# Patient Record
Sex: FEMALE | NOT HISPANIC OR LATINO | Employment: UNEMPLOYED | URBAN - METROPOLITAN AREA
[De-identification: names, ages, dates, MRNs, and addresses within clinical notes are randomized per-mention and may not be internally consistent; named-entity substitution may affect disease eponyms.]

---

## 2020-08-20 ENCOUNTER — TELEPHONE (OUTPATIENT)
Dept: CONSULT | Facility: CLINIC | Age: 25
End: 2020-08-20

## 2020-08-20 NOTE — TELEPHONE ENCOUNTER
Received referral and records for genetics. Schedule with genetic counselor (Makeda hCavez)     Shania

## 2020-08-24 NOTE — TELEPHONE ENCOUNTER
Tried to call to schedule appointment with Makeda Day in genetics.  Was unable to leave a voicemail as mailbox was full.    Shania

## 2020-08-28 NOTE — TELEPHONE ENCOUNTER
Tried to call to schedule appointment with Makeda Day () but unable to leave a message as mailbox was full.  No other number available to call.    Shania

## 2020-09-11 ENCOUNTER — MEDICAL CORRESPONDENCE (OUTPATIENT)
Dept: HEALTH INFORMATION MANAGEMENT | Facility: CLINIC | Age: 25
End: 2020-09-11

## 2020-09-11 ENCOUNTER — TRANSCRIBE ORDERS (OUTPATIENT)
Dept: MATERNAL FETAL MEDICINE | Facility: CLINIC | Age: 25
End: 2020-09-11

## 2020-09-11 ENCOUNTER — TRANSFERRED RECORDS (OUTPATIENT)
Dept: HEALTH INFORMATION MANAGEMENT | Facility: CLINIC | Age: 25
End: 2020-09-11

## 2020-09-11 DIAGNOSIS — O26.90 PREGNANCY RELATED CONDITION, ANTEPARTUM: Primary | ICD-10-CM

## 2020-09-23 ENCOUNTER — PRE VISIT (OUTPATIENT)
Dept: MATERNAL FETAL MEDICINE | Facility: CLINIC | Age: 25
End: 2020-09-23

## 2020-09-25 ENCOUNTER — OFFICE VISIT (OUTPATIENT)
Dept: MATERNAL FETAL MEDICINE | Facility: CLINIC | Age: 25
End: 2020-09-25
Attending: ADVANCED PRACTICE MIDWIFE
Payer: COMMERCIAL

## 2020-09-25 DIAGNOSIS — Z84.89 FAMILY HISTORY OF GENETIC DISORDER: ICD-10-CM

## 2020-09-25 DIAGNOSIS — O26.90 PREGNANCY RELATED CONDITION, ANTEPARTUM: Primary | ICD-10-CM

## 2020-09-25 PROCEDURE — 96040 ZZH GENETIC COUNSELING, EACH 30 MINUTES: CPT | Mod: ZF | Performed by: GENETIC COUNSELOR, MS

## 2020-09-25 NOTE — PROGRESS NOTES
[facility]  Genetic Counseling Consult    Patient:  Lizy Lovelace YOB: 1995   Date of Service:  20      Lizy Lovelace was seen at the [facility] for genetic consultation due to her family history of Aarskog-Hunter Syndrome and Lizy's carrier screening results. Genetic counseling student, Shital Moscoso, was present for the appointment.       Impression/Plan:   1.  Lizy had a genetic consultation today to discuss her family history of Aarskog-Hunter syndrome as well as her carrier screening results that showed that she has an intermediate Fragile X Syndrome allele.    Lizy's brother has a clinical diagnosis of Aarskog-Hunter Syndrome. See the Family History section below for more details on his clinical picture. Lizy did not know if her brother has done genetic testing. She plans to follow up with him and her mother. Lizy has done her own internet research on the condition and inheritance patterns. Today, we reviewed the known genetic causes of Aarskog-Hunter, the inheritance pattern, clinical features, and genetic testing options with Lizy.     Lizy elected to NOT proceed with genetic testing to determine if she is a carrier for Aarskog-Hunter Syndrome due to the low yield and uncertainty regarding her brother's genetic testing. Lizy stated that she wants to start by asking her brother and mother if they have had genetic testing for this condition first. Lizy stated that her own carrier status wouldn't change any of her reproductive planning decisions, however, she stated that she appreciated the information as her main goal was to learn more.     We also reviewed her carrier screening results that identified her as a carrier of an intermediate Fragile X Syndrome allele. See the Carrier Screening section below for more detail.    Pregnancy History:   /Parity:    Age at Delivery: 25 year old  MARCOS: 12/3/2020, by Last  Menstrual Period  Gestational Age: 30w1d    No significant complications or exposures were reported in the current pregnancy.    Lizy eldridge pregnancy history is significant for:  o SAB 7w0d  o Term  female 2019  o SAB 8w0d    Medical History:   Lizy eldridge reported medical history is not expected to impact pregnancy management or risks to fetal development.       Family History:   A three-generation pedigree was obtained, and is scanned under the  Media  tab.   The following significant findings were reported by Lizy:    Lizy reports a previous pregnancy history of one healthy, living daughter and two spontaneous miscarriages at approximately 7w and 8w.    Lizy reports her brother received a childhood diagnosis of Aarskog-Hunter syndrome. He is now 30 years old and doing well. Lizy is not sure if genetic testing was completed. We discussed that her brother likely received a clinical and not a genetic diagnosis since he saw a doctor for several clinical features including ear differences,  flat feet,  and hearing loss. Lizy will reach out to her mother for more information on genetic testing and brother s diagnosis. Lizy also reports that her brother has a diagnosis of  Asperger s  and intellectual disability.    Lizy reports her mother is likely an asymptomatic carrier of Aarskog-Hunter syndrome, and she is unsure if her mother underwent genetic testing. We discussed that her mother may be an unaffected carrier or her brother s condition may be caused by a new mutation that was not inherited.    Lizy reports her father has type 2 diabetes.    Apart from her brother, Lizy denies any maternal family history of Aarskog-Hunter syndrome clinical features including facial features like cleft lip or palate, digital abnormalities like webbed or curved fingers, male genital abnormalities, heart defects, or intellectual disability.    Lizy also denies any maternal or paternal family history of early menopause or ataxic tremors  that might indicate Fragile-X allele expansion in siblings or other family members. This is expected given Lizy s intermediate allele.    Lizy reports her partner Gaurang has a maternal cousin with multiple sclerosis and not muscular dystrophy as previously reported in her records. Additional questions may be helpful in clarifying this diagnosis.    Otherwise, the reported family history is negative for multiple miscarriages, stillbirths, birth defects, cognitive impairment, known genetic conditions, and consanguinity.      Aarskog-Hunter syndrome is a genetic disorder characterized by intellectual disability, shawl scrotum, brachydactyly, short stature, hypertelorism, cervical vertebral anomalies, and cryptorchidism. Prenatal ultrasound findings may include cleft lip/palate and/or syndactyly. Genetic changes, or mutations, in the FGD1 gene are the only known cause of Aarskog-Hunter Syndrome currently. This gene is located on the X chromosome. The mechanism of disease is unclear but is so far thought to be interfering with cytoskeletal organization and skeletal formation.  Aarskog-Hunter syndrome is inherited in an X-linked recessive manner affecting primarily males. Mild manifestations in females have been reported. Males are primarily affected because they only carry one X chromosome, whereas females carry two X chromosomes. If a female carries one mutation in the FGD1 gene, there is still a working copy to compensate for the non-working copy of the gene. Males, however, only have one copy of the FGD1 gene.     Genetic testing is available for Aarskog-Hunter syndrome and consists of sequencing of the FGD1 gene. Only 20% of affected individuals have identifiable mutations in the FGD1 gene. The cause of Aarskog-Hunter syndrome in people with negative/normal sequencing of the FGD1 gene is currently unknown.    At the conclusion of her appointment today, Lizy elected to NOT proceed with genetic testing to determine if she  is a carrier for Aarskog-Hunter Syndrome due to the low yield and uncertainty regarding her brother's genetic testing. Lizy stated that she wants to start by asking her brother and mother if they have had genetic testing for this condition first. Lizy stated that her own carrier status wouldn't change any of her reproductive planning decisions, however, she stated that she appreciated the information as her main goal was to learn more.     Carrier Screening:   The patient reports that she is of  ancestry:      Cystic fibrosis is an autosomal recessive genetic condition that occurs with increased frequency in individuals of  ancestry and carrier screening for this condition is available.  In addition,  screening in the [facility] includes cystic fibrosis.      The patient reports that the father of the pregnancy has  ancestry:      SIckle Cell Anemia is an autosomal recessive genetic condition that occurs with increased frequency in individuals of  ancestry and carrier screening for this condition is available.  In addition,  screening in the [facility] includes Sickle Cell Anemia.      Expanded carrier screening for mutations in a large panel of genes associated with autosomal recessive conditions including cystic fibrosis, spinal muscular atrophy, and others, is now available.      The patient has had previous carrier screening for 14 conditions ([facility]), the results of which were positive showing that Lizy carries an intermediate Fragile X allele.  A copy of the report was available for our review today.      Fragile X: We reviewed some of the basic genetics and inheritance of Fragile X syndrome.  Fragile X syndrome is caused by a trinucleotide repeat expansion in a gene called FMR1.  This gene is located on the X chromosome.  Thus, males have 1 copy of the FMR1 gene as they have 1 X chromosome.  Women have two copies of the X chromosome  "and 2 copies of the FMR1 gene.      Everyone has an area in the gene where the sequnence or trinucleotide, CGG, repeats itself.  A typical CGG repeat number is 5-40. A full mutation is greater than 200 repeats. This size of repeat turns off the gene and causes the features of Fragile X.       There is an \"in between range\" called a premutation.  A person with 55 to 200 repeats is said to have a pre-mutation.  Premutation carrier females are at increased risk for premature ovarian failure (infertility) and premutation carrier males are at risk for an adult onset movement disorder called Fragile X ataxia.  However, premutation carriers are not at risk for intellectual disability, autism, behavior issue, etc.  A premutation can expand when passed to the next generation into a full mutation.     There is also an \"intermediate range\" where there are 40-54 CGG repeats. This is where Magaly allele falls as she has one allele with 51 repeats and one allele with 29 repeats. A carrier of an intermediate number of repeats does not have any clinical symptoms themselves. They are also not at risk of having a child affected by Fragile X syndrome. However, there is a risk of the repeat number expanding into the premutation range in her children.     A premutation carrier is not at risk of having Fragile X syndrome, however, they may have a child with Fragile X syndrome if the premutation allele expands across generations to a full mutation allele. In general, the risk of a maternal premutation becoming a full mutation on transmission to offspring correlates w/number of CGG repeats in the premutation. For premutations w/<100 repeats, the interruption of the CGG repeats by occasional AGG repeats may help evaluate risk of expansion. Females who are heterozygous for a premutation have a 50% risk of transmitting an abnormal (premutation or full-mutation) allele in each pregnancy. Prenatal counseling should be offered when Magaly " children are of reproductive age.     The symptoms of Fragile X are variable but can include, delayed development of speech and language by age 2.  Most males with fragile X syndrome have mild to moderate intellectual disability, while about one-third of female carriers are intellectually disabled.  Children with fragile X syndrome may also have anxiety and hyperactive behavior such as fidgeting or impulsive actions.  They may have attention deficit disorder (ADD), which includes an impaired ability to maintain attention and difficulty focusing on specific tasks.  About one-third of individuals with fragile X syndrome have features of autism spectrum disorders that affect communication and social interaction.  Seizures occur in about 15 percent of males and about 5 percent of females with fragile X syndrome.  Most males and about half of female carriers have characteristic physical features that become more apparent with age.  These features include a long and narrow face, large ears, a prominent jaw and forehead, unusually flexible fingers, flat feet, and in males, enlarged testicles after puberty.          Risk Assessment for Chromosome Conditions:   We explained that the risk for fetal chromosome abnormalities increases with maternal age. We discussed specific features of common chromosome abnormalities, including Down syndrome, trisomy 13, trisomy 18, and sex chromosome trisomies.      - At age 25 at delivery, the risk to have a baby with Down syndrome is 1 in 1111.     - At age 25 at delivery, the risk to have a baby with any chromosome abnormality is 1 in 455.       Lizy had maternal serum screening earlier in pregnancy, as well as a normal fetal survey ultrasound at 20 weeks gestation.     Non-invasive Prenatal Testing (NIPT)    Maternal plasma cell-free DNA testing    Screens for fetal trisomy 21, trisomy 13, trisomy 18, and sex chromosome aneuploidy    First trimester ultrasound with nuchal translucency and  nasal bone assessment was not performed in this pregnancy, to our knowledge.    Lizy had a Panorama test earlier in pregnancy; we reviewed the results today, which are normal for chromosome 13, chromosome 18 and chromosome 21 (no aneuploidy detected)    Given the accuracy of this test, these results greatly decrease the chance for certain fetal chromosome abnormalities    We discussed the limitations of normal NIPT results    MSAFP (after 15 weeks for open neural tube defect screening) results were within normal limits, which decreased the risk of an open neural tube defect in the pregnancy to 1 in 10,000.         Testing Options:   We discussed the following options:   Non-invasive Prenatal Testing (NIPT)    Maternal plasma cell-free DNA testing; first trimester ultrasound with nuchal translucency and nasal bone assessment is recommended, when appropriate    Screens for fetal trisomy 21, trisomy 13, trisomy 18, and sex chromosome aneuploidy    Cannot screen for open neural tube defects; maternal serum AFP after 15 weeks is recommended       Comprehensive (Level II) ultrasound: Detailed ultrasound performed between 18-22 weeks gestation to screen for major birth defects and markers for aneuploidy.      We reviewed the benefits and limitations of this testing.  Screening tests provide a risk assessment specific to the pregnancy for certain fetal chromosome abnormalities, but cannot definitively diagnose or exclude a fetal chromosome abnormality.  Follow-up genetic counseling and consideration of diagnostic testing is recommended with any abnormal screening result.     Diagnostic tests carry inherent risks- including risk of miscarriage- that require careful consideration.  These tests can detect fetal chromosome abnormalities with greater than 99% certainty.  Results can be compromised by maternal cell contamination or mosaicism, and are limited by the resolution of cytogenetic G-banding technology.  There is no  screening nor diagnostic test that can detect all forms of birth defects or mental disability.     It was a pleasure to be involved with Lizy s care. Face-to-face time of the meeting was 45 minutes.        Makeda Day MS, [facility]  Genetic Counselor  Maternal Fetal Medicine  [facility]   Phone: [phone]  Pager: [phone]  Email: [email or web address]

## 2020-12-08 ENCOUNTER — ANESTHESIA - HEALTHEAST (OUTPATIENT)
Dept: OBGYN | Facility: CLINIC | Age: 25
End: 2020-12-08

## 2020-12-08 ENCOUNTER — SURGERY - HEALTHEAST (OUTPATIENT)
Dept: OBGYN | Facility: CLINIC | Age: 25
End: 2020-12-08

## 2020-12-08 ASSESSMENT — MIFFLIN-ST. JEOR: SCORE: 1643.08

## 2020-12-09 ENCOUNTER — COMMUNICATION - HEALTHEAST (OUTPATIENT)
Dept: SCHEDULING | Facility: CLINIC | Age: 25
End: 2020-12-09

## 2020-12-10 ENCOUNTER — HOME CARE/HOSPICE - HEALTHEAST (OUTPATIENT)
Dept: HOME HEALTH SERVICES | Facility: HOME HEALTH | Age: 25
End: 2020-12-10

## 2020-12-11 ENCOUNTER — HOME CARE/HOSPICE - HEALTHEAST (OUTPATIENT)
Dept: HOME HEALTH SERVICES | Facility: HOME HEALTH | Age: 25
End: 2020-12-11

## 2021-05-26 VITALS
SYSTOLIC BLOOD PRESSURE: 118 MMHG | TEMPERATURE: 97.7 F | DIASTOLIC BLOOD PRESSURE: 78 MMHG | OXYGEN SATURATION: 98 % | RESPIRATION RATE: 16 BRPM | HEART RATE: 77 BPM

## 2021-06-05 VITALS — HEIGHT: 63 IN | BODY MASS INDEX: 36.68 KG/M2 | WEIGHT: 207 LBS

## 2021-06-13 NOTE — ANESTHESIA POSTPROCEDURE EVALUATION
Patient: Lizy Lovelace  Procedure(s):   SECTION  Anesthesia type: epidural    Patient location: Labor and Delivery  Last vitals:   Vitals Value Taken Time   /65 20 1115   Temp 36.9  C (98.5  F) 20 1010   Pulse 70 20 1115   Resp 16 20 1115   SpO2 97 % 20 1115     Post vital signs: stable  Level of consciousness: awake and responds to simple questions  Post-anesthesia pain: pain controlled  Post-anesthesia nausea and vomiting: no  Pulmonary: unassisted, return to baseline  Cardiovascular: stable and blood pressure at baseline  Hydration: adequate  Anesthetic events: no    QCDR Measures:  ASA# 11 - Emma-op Cardiac Arrest: ASA11B - Patient did NOT experience unanticipated cardiac arrest  ASA# 12 - Emma-op Mortality Rate: ASA12B - Patient did NOT die  ASA# 13 - PACU Re-Intubation Rate: NA - No ETT / LMA used for case  ASA# 10 - Composite Anes Safety: ASA10A - No serious adverse event    Additional Notes:

## 2021-06-13 NOTE — ANESTHESIA CARE TRANSFER NOTE
Last vitals:   Vitals:    12/08/20 0922   BP: (!) 89/55   Pulse: 90   Resp: 12   Temp: 37.2  C (99  F)   SpO2: 99%     Patient's level of consciousness is awake  Spontaneous respirations: yes  Maintains airway independently: yes  Dentition unchanged: yes  Oropharynx: oropharynx clear of all foreign objects    QCDR Measures:  ASA# 20 - Surgical Safety Checklist: WHO surgical safety checklist completed prior to induction    PQRS# 430 - Adult PONV Prevention: 4558F - Pt received => 2 anti-emetic agents (different classes) preop & intraop  ASA# 8 - Peds PONV Prevention: NA - Not pediatric patient, not GA or 2 or more risk factors NOT present  PQRS# 424 - Emma-op Temp Management: 4559F - At least one body temp DOCUMENTED => 35.5C or 95.9F within required timeframe  PQRS# 426 - PACU Transfer Protocol: - Transfer of care checklist used  ASA# 14 - Acute Post-op Pain: ASA14B - Patient did NOT experience pain >= 7 out of 10

## 2021-06-13 NOTE — ANESTHESIA PREPROCEDURE EVALUATION
Anesthesia Evaluation      Patient summary reviewed   No history of anesthetic complications     Airway   Mallampati: I  Neck ROM: full   Pulmonary - negative ROS and normal exam                          Cardiovascular - negative ROS and normal exam   Neuro/Psych - negative ROS     Endo/Other - negative ROS   (+) obesity (BMI 37), pregnant     GI/Hepatic/Renal - negative ROS           Dental - normal exam                        Anesthesia Plan  Planned anesthetic: epidural    ASA 2     Anesthetic plan and risks discussed with: patient    Post-op plan: routine recovery

## 2021-06-13 NOTE — ANESTHESIA PROCEDURE NOTES
Epidural Block    Patient location during procedure: OB  Time Called: 12/8/2020 4:24 AM  Reason for Block:labor epidural  Staffing:  Performing  Anesthesiologist: Raciel Foy MD  Preanesthetic Checklist  Completed: patient identified, risks, benefits, and alternatives discussed, timeout performed, consent obtained, at patient's request, airway assessed, oxygen available, suction available, emergency drugs available and hand hygiene performed  Procedure  Patient position: sitting  Prep: ChloraPrep  Patient monitoring: continuous pulse oximetry, heart rate and blood pressure  Approach: midline  Location: L3-L4  Injection technique: ESAU saline  Number of Attempts:1  Needle  Needle type: NemeriXекатерина   Needle gauge: 18 G     Catheter in Space: 4  Assessment  Sensory level:  No complications      Additional Notes:  After negative aspirate for heme/CSF, a test dose was given using 3 ml 1.5% xylocaine with epinephrine 1:200K.  Test dose was negative.  Catheter was taped securely.  Epidural catheter was then bolused with 8ml 0.25% Marcaine.  Epidural infusion initiated by OB RN as per orders.  Procedure was well tolerated.

## 2021-06-16 PROBLEM — Z34.90 PREGNANT: Status: ACTIVE | Noted: 2020-12-08

## 2022-04-18 LAB
HEPATITIS B SURFACE ANTIGEN (EXTERNAL): NONREACTIVE
HEPATITIS B SURFACE ANTIGEN (EXTERNAL): NONREACTIVE
HIV1+2 AB SERPL QL IA: NONREACTIVE
RUBELLA ANTIBODY IGG (EXTERNAL): NORMAL

## 2022-08-24 LAB — VDRL (SYPHILIS) (EXTERNAL): NONREACTIVE

## 2022-10-23 LAB — GROUP B STREPTOCOCCUS (EXTERNAL): POSITIVE

## 2022-11-07 ENCOUNTER — LAB (OUTPATIENT)
Dept: LAB | Facility: CLINIC | Age: 27
End: 2022-11-07
Payer: COMMERCIAL

## 2022-11-07 DIAGNOSIS — Z20.822 ENCOUNTER FOR LABORATORY TESTING FOR COVID-19 VIRUS: ICD-10-CM

## 2022-11-07 PROCEDURE — U0005 INFEC AGEN DETEC AMPLI PROBE: HCPCS

## 2022-11-07 PROCEDURE — U0003 INFECTIOUS AGENT DETECTION BY NUCLEIC ACID (DNA OR RNA); SEVERE ACUTE RESPIRATORY SYNDROME CORONAVIRUS 2 (SARS-COV-2) (CORONAVIRUS DISEASE [COVID-19]), AMPLIFIED PROBE TECHNIQUE, MAKING USE OF HIGH THROUGHPUT TECHNOLOGIES AS DESCRIBED BY CMS-2020-01-R: HCPCS

## 2022-11-08 ENCOUNTER — PREP FOR PROCEDURE (OUTPATIENT)
Dept: OBGYN | Facility: CLINIC | Age: 27
End: 2022-11-08

## 2022-11-08 LAB — SARS-COV-2 RNA RESP QL NAA+PROBE: NEGATIVE

## 2022-11-08 RX ORDER — OXYTOCIN/0.9 % SODIUM CHLORIDE 30/500 ML
100-340 PLASTIC BAG, INJECTION (ML) INTRAVENOUS CONTINUOUS PRN
Status: CANCELLED | OUTPATIENT
Start: 2022-11-08

## 2022-11-08 RX ORDER — ACETAMINOPHEN 325 MG/1
975 TABLET ORAL ONCE
Status: CANCELLED | OUTPATIENT
Start: 2022-11-08 | End: 2022-11-08

## 2022-11-08 RX ORDER — OXYTOCIN/0.9 % SODIUM CHLORIDE 30/500 ML
340 PLASTIC BAG, INJECTION (ML) INTRAVENOUS CONTINUOUS PRN
Status: CANCELLED | OUTPATIENT
Start: 2022-11-08

## 2022-11-08 RX ORDER — OXYTOCIN 10 [USP'U]/ML
10 INJECTION, SOLUTION INTRAMUSCULAR; INTRAVENOUS
Status: CANCELLED | OUTPATIENT
Start: 2022-11-08

## 2022-11-08 RX ORDER — METHYLERGONOVINE MALEATE 0.2 MG/ML
200 INJECTION INTRAVENOUS
Status: CANCELLED | OUTPATIENT
Start: 2022-11-08

## 2022-11-08 RX ORDER — CARBOPROST TROMETHAMINE 250 UG/ML
250 INJECTION, SOLUTION INTRAMUSCULAR
Status: CANCELLED | OUTPATIENT
Start: 2022-11-08

## 2022-11-08 RX ORDER — MISOPROSTOL 100 UG/1
400 TABLET ORAL
Status: CANCELLED | OUTPATIENT
Start: 2022-11-08

## 2022-11-08 RX ORDER — MISOPROSTOL 200 UG/1
800 TABLET ORAL
Status: CANCELLED | OUTPATIENT
Start: 2022-11-08

## 2022-11-08 RX ORDER — LIDOCAINE 40 MG/G
CREAM TOPICAL
Status: CANCELLED | OUTPATIENT
Start: 2022-11-08

## 2022-11-08 RX ORDER — CITRIC ACID/SODIUM CITRATE 334-500MG
30 SOLUTION, ORAL ORAL
Status: CANCELLED | OUTPATIENT
Start: 2022-11-08

## 2022-11-08 RX ORDER — SODIUM CHLORIDE, SODIUM LACTATE, POTASSIUM CHLORIDE, CALCIUM CHLORIDE 600; 310; 30; 20 MG/100ML; MG/100ML; MG/100ML; MG/100ML
INJECTION, SOLUTION INTRAVENOUS CONTINUOUS
Status: CANCELLED | OUTPATIENT
Start: 2022-11-08

## 2022-11-09 ENCOUNTER — ANESTHESIA (OUTPATIENT)
Dept: OBGYN | Facility: CLINIC | Age: 27
End: 2022-11-09
Payer: COMMERCIAL

## 2022-11-09 ENCOUNTER — HOSPITAL ENCOUNTER (INPATIENT)
Facility: CLINIC | Age: 27
LOS: 2 days | Discharge: HOME | End: 2022-11-11
Attending: OBSTETRICS & GYNECOLOGY | Admitting: OBSTETRICS & GYNECOLOGY
Payer: COMMERCIAL

## 2022-11-09 ENCOUNTER — ANESTHESIA EVENT (OUTPATIENT)
Dept: OBGYN | Facility: CLINIC | Age: 27
End: 2022-11-09
Payer: COMMERCIAL

## 2022-11-09 LAB
ABO/RH(D): NORMAL
ANTIBODY SCREEN: NEGATIVE
HGB BLD-MCNC: 13.3 G/DL (ref 11.7–15.7)
SPECIMEN EXPIRATION DATE: NORMAL
T PALLIDUM AB SER QL: NONREACTIVE

## 2022-11-09 PROCEDURE — 250N000011 HC RX IP 250 OP 636: Performed by: NURSE ANESTHETIST, CERTIFIED REGISTERED

## 2022-11-09 PROCEDURE — 0UT70ZZ RESECTION OF BILATERAL FALLOPIAN TUBES, OPEN APPROACH: ICD-10-PCS | Performed by: OBSTETRICS & GYNECOLOGY

## 2022-11-09 PROCEDURE — 272N000001 HC OR GENERAL SUPPLY STERILE: Performed by: OBSTETRICS & GYNECOLOGY

## 2022-11-09 PROCEDURE — 258N000003 HC RX IP 258 OP 636: Performed by: OBSTETRICS & GYNECOLOGY

## 2022-11-09 PROCEDURE — 120N000001 HC R&B MED SURG/OB

## 2022-11-09 PROCEDURE — 88302 TISSUE EXAM BY PATHOLOGIST: CPT | Mod: TC | Performed by: OBSTETRICS & GYNECOLOGY

## 2022-11-09 PROCEDURE — 86780 TREPONEMA PALLIDUM: CPT | Performed by: OBSTETRICS & GYNECOLOGY

## 2022-11-09 PROCEDURE — 88302 TISSUE EXAM BY PATHOLOGIST: CPT | Mod: 26 | Performed by: PATHOLOGY

## 2022-11-09 PROCEDURE — 86901 BLOOD TYPING SEROLOGIC RH(D): CPT | Performed by: OBSTETRICS & GYNECOLOGY

## 2022-11-09 PROCEDURE — 370N000017 HC ANESTHESIA TECHNICAL FEE, PER MIN: Performed by: OBSTETRICS & GYNECOLOGY

## 2022-11-09 PROCEDURE — 250N000013 HC RX MED GY IP 250 OP 250 PS 637: Performed by: OBSTETRICS & GYNECOLOGY

## 2022-11-09 PROCEDURE — 999N000249 HC STATISTIC C-SECTION ON UNIT

## 2022-11-09 PROCEDURE — 36415 COLL VENOUS BLD VENIPUNCTURE: CPT | Performed by: OBSTETRICS & GYNECOLOGY

## 2022-11-09 PROCEDURE — 250N000011 HC RX IP 250 OP 636: Performed by: OBSTETRICS & GYNECOLOGY

## 2022-11-09 PROCEDURE — 250N000011 HC RX IP 250 OP 636: Performed by: ANESTHESIOLOGY

## 2022-11-09 PROCEDURE — 360N000076 HC SURGERY LEVEL 3, PER MIN: Performed by: OBSTETRICS & GYNECOLOGY

## 2022-11-09 PROCEDURE — 258N000003 HC RX IP 258 OP 636: Performed by: NURSE ANESTHETIST, CERTIFIED REGISTERED

## 2022-11-09 PROCEDURE — 85018 HEMOGLOBIN: CPT | Performed by: OBSTETRICS & GYNECOLOGY

## 2022-11-09 PROCEDURE — 250N000009 HC RX 250: Performed by: NURSE ANESTHETIST, CERTIFIED REGISTERED

## 2022-11-09 RX ORDER — CARBOPROST TROMETHAMINE 250 UG/ML
250 INJECTION, SOLUTION INTRAMUSCULAR
Status: DISCONTINUED | OUTPATIENT
Start: 2022-11-09 | End: 2022-11-11 | Stop reason: HOSPADM

## 2022-11-09 RX ORDER — SODIUM CHLORIDE, SODIUM LACTATE, POTASSIUM CHLORIDE, CALCIUM CHLORIDE 600; 310; 30; 20 MG/100ML; MG/100ML; MG/100ML; MG/100ML
INJECTION, SOLUTION INTRAVENOUS CONTINUOUS
Status: DISCONTINUED | OUTPATIENT
Start: 2022-11-09 | End: 2022-11-09 | Stop reason: HOSPADM

## 2022-11-09 RX ORDER — METOCLOPRAMIDE HYDROCHLORIDE 5 MG/ML
10 INJECTION INTRAMUSCULAR; INTRAVENOUS EVERY 6 HOURS PRN
Status: DISCONTINUED | OUTPATIENT
Start: 2022-11-09 | End: 2022-11-11 | Stop reason: HOSPADM

## 2022-11-09 RX ORDER — ONDANSETRON 4 MG/1
4 TABLET, ORALLY DISINTEGRATING ORAL EVERY 30 MIN PRN
Status: DISCONTINUED | OUTPATIENT
Start: 2022-11-09 | End: 2022-11-11 | Stop reason: HOSPADM

## 2022-11-09 RX ORDER — AMOXICILLIN 250 MG
1 CAPSULE ORAL 2 TIMES DAILY
Status: DISCONTINUED | OUTPATIENT
Start: 2022-11-09 | End: 2022-11-11 | Stop reason: HOSPADM

## 2022-11-09 RX ORDER — NALOXONE HYDROCHLORIDE 0.4 MG/ML
0.4 INJECTION, SOLUTION INTRAMUSCULAR; INTRAVENOUS; SUBCUTANEOUS
Status: CANCELLED | OUTPATIENT
Start: 2022-11-09

## 2022-11-09 RX ORDER — SODIUM CHLORIDE, SODIUM LACTATE, POTASSIUM CHLORIDE, CALCIUM CHLORIDE 600; 310; 30; 20 MG/100ML; MG/100ML; MG/100ML; MG/100ML
INJECTION, SOLUTION INTRAVENOUS CONTINUOUS
Status: DISCONTINUED | OUTPATIENT
Start: 2022-11-09 | End: 2022-11-11 | Stop reason: HOSPADM

## 2022-11-09 RX ORDER — KETOROLAC TROMETHAMINE 30 MG/ML
INJECTION, SOLUTION INTRAMUSCULAR; INTRAVENOUS PRN
Status: DISCONTINUED | OUTPATIENT
Start: 2022-11-09 | End: 2022-11-09

## 2022-11-09 RX ORDER — CLINDAMYCIN PHOSPHATE 900 MG/50ML
900 INJECTION, SOLUTION INTRAVENOUS
Status: COMPLETED | OUTPATIENT
Start: 2022-11-09 | End: 2022-11-09

## 2022-11-09 RX ORDER — NALOXONE HYDROCHLORIDE 0.4 MG/ML
0.2 INJECTION, SOLUTION INTRAMUSCULAR; INTRAVENOUS; SUBCUTANEOUS
Status: CANCELLED | OUTPATIENT
Start: 2022-11-09

## 2022-11-09 RX ORDER — BUPIVACAINE HYDROCHLORIDE 2.5 MG/ML
INJECTION, SOLUTION EPIDURAL; INFILTRATION; INTRACAUDAL
Status: DISCONTINUED | OUTPATIENT
Start: 2022-11-09 | End: 2022-11-09

## 2022-11-09 RX ORDER — ONDANSETRON 2 MG/ML
4 INJECTION INTRAMUSCULAR; INTRAVENOUS EVERY 30 MIN PRN
Status: DISCONTINUED | OUTPATIENT
Start: 2022-11-09 | End: 2022-11-11 | Stop reason: HOSPADM

## 2022-11-09 RX ORDER — DEXAMETHASONE SODIUM PHOSPHATE 4 MG/ML
INJECTION, SOLUTION INTRA-ARTICULAR; INTRALESIONAL; INTRAMUSCULAR; INTRAVENOUS; SOFT TISSUE PRN
Status: DISCONTINUED | OUTPATIENT
Start: 2022-11-09 | End: 2022-11-09

## 2022-11-09 RX ORDER — DIPHENHYDRAMINE HYDROCHLORIDE 50 MG/ML
25 INJECTION INTRAMUSCULAR; INTRAVENOUS EVERY 6 HOURS PRN
Status: DISCONTINUED | OUTPATIENT
Start: 2022-11-09 | End: 2022-11-11 | Stop reason: HOSPADM

## 2022-11-09 RX ORDER — OXYTOCIN/0.9 % SODIUM CHLORIDE 30/500 ML
340 PLASTIC BAG, INJECTION (ML) INTRAVENOUS CONTINUOUS PRN
Status: DISCONTINUED | OUTPATIENT
Start: 2022-11-09 | End: 2022-11-11 | Stop reason: HOSPADM

## 2022-11-09 RX ORDER — CARBOPROST TROMETHAMINE 250 UG/ML
250 INJECTION, SOLUTION INTRAMUSCULAR
Status: DISCONTINUED | OUTPATIENT
Start: 2022-11-09 | End: 2022-11-09 | Stop reason: HOSPADM

## 2022-11-09 RX ORDER — MISOPROSTOL 200 UG/1
400 TABLET ORAL
Status: DISCONTINUED | OUTPATIENT
Start: 2022-11-09 | End: 2022-11-09 | Stop reason: HOSPADM

## 2022-11-09 RX ORDER — HALOPERIDOL 5 MG/ML
1 INJECTION INTRAMUSCULAR
Status: DISCONTINUED | OUTPATIENT
Start: 2022-11-09 | End: 2022-11-11 | Stop reason: HOSPADM

## 2022-11-09 RX ORDER — ONDANSETRON 2 MG/ML
INJECTION INTRAMUSCULAR; INTRAVENOUS PRN
Status: DISCONTINUED | OUTPATIENT
Start: 2022-11-09 | End: 2022-11-09

## 2022-11-09 RX ORDER — AMOXICILLIN 250 MG
2 CAPSULE ORAL 2 TIMES DAILY
Status: DISCONTINUED | OUTPATIENT
Start: 2022-11-09 | End: 2022-11-11 | Stop reason: HOSPADM

## 2022-11-09 RX ORDER — METHYLERGONOVINE MALEATE 0.2 MG/ML
200 INJECTION INTRAVENOUS
Status: DISCONTINUED | OUTPATIENT
Start: 2022-11-09 | End: 2022-11-09 | Stop reason: HOSPADM

## 2022-11-09 RX ORDER — PROCHLORPERAZINE 25 MG
25 SUPPOSITORY, RECTAL RECTAL EVERY 12 HOURS PRN
Status: DISCONTINUED | OUTPATIENT
Start: 2022-11-09 | End: 2022-11-11 | Stop reason: HOSPADM

## 2022-11-09 RX ORDER — SIMETHICONE 80 MG
80 TABLET,CHEWABLE ORAL 4 TIMES DAILY PRN
Status: DISCONTINUED | OUTPATIENT
Start: 2022-11-09 | End: 2022-11-11 | Stop reason: HOSPADM

## 2022-11-09 RX ORDER — DIAZEPAM 10 MG/2ML
2.5 INJECTION, SOLUTION INTRAMUSCULAR; INTRAVENOUS
Status: DISCONTINUED | OUTPATIENT
Start: 2022-11-09 | End: 2022-11-11 | Stop reason: HOSPADM

## 2022-11-09 RX ORDER — HYDROMORPHONE HCL IN WATER/PF 6 MG/30 ML
0.2 PATIENT CONTROLLED ANALGESIA SYRINGE INTRAVENOUS EVERY 5 MIN PRN
Status: DISCONTINUED | OUTPATIENT
Start: 2022-11-09 | End: 2022-11-11 | Stop reason: HOSPADM

## 2022-11-09 RX ORDER — OXYTOCIN/0.9 % SODIUM CHLORIDE 30/500 ML
340 PLASTIC BAG, INJECTION (ML) INTRAVENOUS CONTINUOUS PRN
Status: DISCONTINUED | OUTPATIENT
Start: 2022-11-09 | End: 2022-11-09 | Stop reason: HOSPADM

## 2022-11-09 RX ORDER — OXYTOCIN/0.9 % SODIUM CHLORIDE 30/500 ML
PLASTIC BAG, INJECTION (ML) INTRAVENOUS CONTINUOUS PRN
Status: DISCONTINUED | OUTPATIENT
Start: 2022-11-09 | End: 2022-11-09

## 2022-11-09 RX ORDER — OXYTOCIN/0.9 % SODIUM CHLORIDE 30/500 ML
100-340 PLASTIC BAG, INJECTION (ML) INTRAVENOUS CONTINUOUS PRN
Status: DISCONTINUED | OUTPATIENT
Start: 2022-11-09 | End: 2022-11-11 | Stop reason: HOSPADM

## 2022-11-09 RX ORDER — LIDOCAINE 40 MG/G
CREAM TOPICAL
Status: DISCONTINUED | OUTPATIENT
Start: 2022-11-09 | End: 2022-11-11 | Stop reason: HOSPADM

## 2022-11-09 RX ORDER — NALOXONE HYDROCHLORIDE 0.4 MG/ML
0.4 INJECTION, SOLUTION INTRAMUSCULAR; INTRAVENOUS; SUBCUTANEOUS
Status: DISCONTINUED | OUTPATIENT
Start: 2022-11-09 | End: 2022-11-11 | Stop reason: HOSPADM

## 2022-11-09 RX ORDER — BUPIVACAINE HYDROCHLORIDE 7.5 MG/ML
INJECTION, SOLUTION INTRASPINAL
Status: COMPLETED | OUTPATIENT
Start: 2022-11-09 | End: 2022-11-09

## 2022-11-09 RX ORDER — OXYTOCIN 10 [USP'U]/ML
10 INJECTION, SOLUTION INTRAMUSCULAR; INTRAVENOUS
Status: DISCONTINUED | OUTPATIENT
Start: 2022-11-09 | End: 2022-11-11 | Stop reason: HOSPADM

## 2022-11-09 RX ORDER — BISACODYL 10 MG
10 SUPPOSITORY, RECTAL RECTAL DAILY PRN
Status: DISCONTINUED | OUTPATIENT
Start: 2022-11-11 | End: 2022-11-11 | Stop reason: HOSPADM

## 2022-11-09 RX ORDER — LIDOCAINE 40 MG/G
CREAM TOPICAL
Status: DISCONTINUED | OUTPATIENT
Start: 2022-11-09 | End: 2022-11-09 | Stop reason: HOSPADM

## 2022-11-09 RX ORDER — ONDANSETRON 2 MG/ML
4 INJECTION INTRAMUSCULAR; INTRAVENOUS EVERY 6 HOURS PRN
Status: DISCONTINUED | OUTPATIENT
Start: 2022-11-09 | End: 2022-11-11 | Stop reason: HOSPADM

## 2022-11-09 RX ORDER — DIPHENHYDRAMINE HCL 25 MG
25 CAPSULE ORAL EVERY 6 HOURS PRN
Status: DISCONTINUED | OUTPATIENT
Start: 2022-11-09 | End: 2022-11-11 | Stop reason: HOSPADM

## 2022-11-09 RX ORDER — ACETAMINOPHEN 325 MG/1
975 TABLET ORAL EVERY 6 HOURS
Status: DISCONTINUED | OUTPATIENT
Start: 2022-11-09 | End: 2022-11-11 | Stop reason: HOSPADM

## 2022-11-09 RX ORDER — OXYCODONE HYDROCHLORIDE 5 MG/1
5 TABLET ORAL EVERY 4 HOURS PRN
Status: DISCONTINUED | OUTPATIENT
Start: 2022-11-09 | End: 2022-11-11 | Stop reason: HOSPADM

## 2022-11-09 RX ORDER — KETOROLAC TROMETHAMINE 30 MG/ML
30 INJECTION, SOLUTION INTRAMUSCULAR; INTRAVENOUS EVERY 6 HOURS
Status: COMPLETED | OUTPATIENT
Start: 2022-11-09 | End: 2022-11-10

## 2022-11-09 RX ORDER — PRENATAL VIT/IRON FUM/FOLIC AC 27MG-0.8MG
1 TABLET ORAL DAILY
COMMUNITY

## 2022-11-09 RX ORDER — ONDANSETRON 4 MG/1
4 TABLET, ORALLY DISINTEGRATING ORAL EVERY 6 HOURS PRN
Status: DISCONTINUED | OUTPATIENT
Start: 2022-11-09 | End: 2022-11-11 | Stop reason: HOSPADM

## 2022-11-09 RX ORDER — NALOXONE HYDROCHLORIDE 0.4 MG/ML
0.2 INJECTION, SOLUTION INTRAMUSCULAR; INTRAVENOUS; SUBCUTANEOUS
Status: DISCONTINUED | OUTPATIENT
Start: 2022-11-09 | End: 2022-11-11 | Stop reason: HOSPADM

## 2022-11-09 RX ORDER — CITRIC ACID/SODIUM CITRATE 334-500MG
30 SOLUTION, ORAL ORAL
Status: COMPLETED | OUTPATIENT
Start: 2022-11-09 | End: 2022-11-09

## 2022-11-09 RX ORDER — VALACYCLOVIR HYDROCHLORIDE 500 MG/1
500 TABLET, FILM COATED ORAL 2 TIMES DAILY
Status: ON HOLD | COMMUNITY
End: 2022-11-11

## 2022-11-09 RX ORDER — DEXTROSE, SODIUM CHLORIDE, SODIUM LACTATE, POTASSIUM CHLORIDE, AND CALCIUM CHLORIDE 5; .6; .31; .03; .02 G/100ML; G/100ML; G/100ML; G/100ML; G/100ML
INJECTION, SOLUTION INTRAVENOUS CONTINUOUS
Status: DISCONTINUED | OUTPATIENT
Start: 2022-11-09 | End: 2022-11-11 | Stop reason: HOSPADM

## 2022-11-09 RX ORDER — MISOPROSTOL 200 UG/1
400 TABLET ORAL
Status: DISCONTINUED | OUTPATIENT
Start: 2022-11-09 | End: 2022-11-11 | Stop reason: HOSPADM

## 2022-11-09 RX ORDER — GLYCOPYRROLATE 0.2 MG/ML
INJECTION, SOLUTION INTRAMUSCULAR; INTRAVENOUS PRN
Status: DISCONTINUED | OUTPATIENT
Start: 2022-11-09 | End: 2022-11-09

## 2022-11-09 RX ORDER — METOCLOPRAMIDE 10 MG/1
10 TABLET ORAL EVERY 6 HOURS PRN
Status: DISCONTINUED | OUTPATIENT
Start: 2022-11-09 | End: 2022-11-11 | Stop reason: HOSPADM

## 2022-11-09 RX ORDER — MORPHINE SULFATE 1 MG/ML
INJECTION, SOLUTION EPIDURAL; INTRATHECAL; INTRAVENOUS
Status: COMPLETED | OUTPATIENT
Start: 2022-11-09 | End: 2022-11-09

## 2022-11-09 RX ORDER — FENTANYL CITRATE 50 UG/ML
50 INJECTION, SOLUTION INTRAMUSCULAR; INTRAVENOUS
Status: DISCONTINUED | OUTPATIENT
Start: 2022-11-09 | End: 2022-11-09 | Stop reason: HOSPADM

## 2022-11-09 RX ORDER — MISOPROSTOL 200 UG/1
800 TABLET ORAL
Status: DISCONTINUED | OUTPATIENT
Start: 2022-11-09 | End: 2022-11-09 | Stop reason: HOSPADM

## 2022-11-09 RX ORDER — IBUPROFEN 800 MG/1
800 TABLET, FILM COATED ORAL EVERY 6 HOURS
Status: DISCONTINUED | OUTPATIENT
Start: 2022-11-10 | End: 2022-11-11 | Stop reason: HOSPADM

## 2022-11-09 RX ORDER — METHYLERGONOVINE MALEATE 0.2 MG/ML
200 INJECTION INTRAVENOUS
Status: DISCONTINUED | OUTPATIENT
Start: 2022-11-09 | End: 2022-11-11 | Stop reason: HOSPADM

## 2022-11-09 RX ORDER — MISOPROSTOL 200 UG/1
800 TABLET ORAL
Status: DISCONTINUED | OUTPATIENT
Start: 2022-11-09 | End: 2022-11-11 | Stop reason: HOSPADM

## 2022-11-09 RX ORDER — FENTANYL CITRATE 50 UG/ML
25 INJECTION, SOLUTION INTRAMUSCULAR; INTRAVENOUS EVERY 5 MIN PRN
Status: DISCONTINUED | OUTPATIENT
Start: 2022-11-09 | End: 2022-11-11 | Stop reason: HOSPADM

## 2022-11-09 RX ORDER — HYDROCORTISONE 25 MG/G
CREAM TOPICAL 3 TIMES DAILY PRN
Status: DISCONTINUED | OUTPATIENT
Start: 2022-11-09 | End: 2022-11-11 | Stop reason: HOSPADM

## 2022-11-09 RX ORDER — MODIFIED LANOLIN
OINTMENT (GRAM) TOPICAL
Status: DISCONTINUED | OUTPATIENT
Start: 2022-11-09 | End: 2022-11-11 | Stop reason: HOSPADM

## 2022-11-09 RX ORDER — OXYTOCIN 10 [USP'U]/ML
10 INJECTION, SOLUTION INTRAMUSCULAR; INTRAVENOUS
Status: DISCONTINUED | OUTPATIENT
Start: 2022-11-09 | End: 2022-11-09 | Stop reason: HOSPADM

## 2022-11-09 RX ORDER — ACETAMINOPHEN 325 MG/1
975 TABLET ORAL ONCE
Status: COMPLETED | OUTPATIENT
Start: 2022-11-09 | End: 2022-11-09

## 2022-11-09 RX ORDER — PROCHLORPERAZINE MALEATE 10 MG
10 TABLET ORAL EVERY 6 HOURS PRN
Status: DISCONTINUED | OUTPATIENT
Start: 2022-11-09 | End: 2022-11-11 | Stop reason: HOSPADM

## 2022-11-09 RX ADMIN — GLYCOPYRROLATE 0.2 MG: 0.2 INJECTION, SOLUTION INTRAMUSCULAR; INTRAVENOUS at 15:26

## 2022-11-09 RX ADMIN — BUPIVACAINE HYDROCHLORIDE IN DEXTROSE 1.6 ML: 7.5 INJECTION, SOLUTION SUBARACHNOID at 15:21

## 2022-11-09 RX ADMIN — SENNOSIDES AND DOCUSATE SODIUM 1 TABLET: 50; 8.6 TABLET ORAL at 20:20

## 2022-11-09 RX ADMIN — SODIUM CITRATE AND CITRIC ACID MONOHYDRATE 30 ML: 500; 334 SOLUTION ORAL at 14:56

## 2022-11-09 RX ADMIN — BUPIVACAINE HYDROCHLORIDE 50 ML: 2.5 INJECTION, SOLUTION EPIDURAL; INFILTRATION; INTRACAUDAL at 16:33

## 2022-11-09 RX ADMIN — SODIUM CHLORIDE, POTASSIUM CHLORIDE, SODIUM LACTATE AND CALCIUM CHLORIDE: 600; 310; 30; 20 INJECTION, SOLUTION INTRAVENOUS at 12:41

## 2022-11-09 RX ADMIN — SODIUM CHLORIDE, POTASSIUM CHLORIDE, SODIUM LACTATE AND CALCIUM CHLORIDE: 600; 310; 30; 20 INJECTION, SOLUTION INTRAVENOUS at 15:40

## 2022-11-09 RX ADMIN — ACETAMINOPHEN 975 MG: 325 TABLET, FILM COATED ORAL at 20:21

## 2022-11-09 RX ADMIN — GENTAMICIN SULFATE 130 MG: 40 INJECTION, SOLUTION INTRAMUSCULAR; INTRAVENOUS at 14:56

## 2022-11-09 RX ADMIN — Medication 600 ML/HR: at 15:43

## 2022-11-09 RX ADMIN — SODIUM CHLORIDE, POTASSIUM CHLORIDE, SODIUM LACTATE AND CALCIUM CHLORIDE: 600; 310; 30; 20 INJECTION, SOLUTION INTRAVENOUS at 11:36

## 2022-11-09 RX ADMIN — KETOROLAC TROMETHAMINE 30 MG: 30 INJECTION, SOLUTION INTRAMUSCULAR; INTRAVENOUS at 23:35

## 2022-11-09 RX ADMIN — ACETAMINOPHEN 975 MG: 325 TABLET, FILM COATED ORAL at 14:56

## 2022-11-09 RX ADMIN — PHENYLEPHRINE HYDROCHLORIDE 0.6 MCG/KG/MIN: 10 INJECTION INTRAVENOUS at 15:20

## 2022-11-09 RX ADMIN — Medication 0.15 MG: at 15:21

## 2022-11-09 RX ADMIN — PHENYLEPHRINE HYDROCHLORIDE 100 MCG: 10 INJECTION INTRAVENOUS at 16:13

## 2022-11-09 RX ADMIN — CLINDAMYCIN PHOSPHATE 900 MG: 900 INJECTION, SOLUTION INTRAVENOUS at 15:16

## 2022-11-09 RX ADMIN — PHENYLEPHRINE HYDROCHLORIDE 100 MCG: 10 INJECTION INTRAVENOUS at 16:16

## 2022-11-09 RX ADMIN — ONDANSETRON 4 MG: 2 INJECTION INTRAMUSCULAR; INTRAVENOUS at 15:17

## 2022-11-09 RX ADMIN — KETOROLAC TROMETHAMINE 15 MG: 30 INJECTION, SOLUTION INTRAMUSCULAR at 16:44

## 2022-11-09 RX ADMIN — DEXAMETHASONE SODIUM PHOSPHATE 4 MG: 4 INJECTION, SOLUTION INTRA-ARTICULAR; INTRALESIONAL; INTRAMUSCULAR; INTRAVENOUS; SOFT TISSUE at 15:52

## 2022-11-09 RX ADMIN — GLYCOPYRROLATE 0.2 MG: 0.2 INJECTION, SOLUTION INTRAMUSCULAR; INTRAVENOUS at 15:23

## 2022-11-09 ASSESSMENT — ACTIVITIES OF DAILY LIVING (ADL)
DIFFICULTY_COMMUNICATING: NO
ADLS_ACUITY_SCORE: 18
ADLS_ACUITY_SCORE: 18
WEAR_GLASSES_OR_BLIND: NO
DOING_ERRANDS_INDEPENDENTLY_DIFFICULTY: NO
DIFFICULTY_EATING/SWALLOWING: NO
CHANGE_IN_FUNCTIONAL_STATUS_SINCE_ONSET_OF_CURRENT_ILLNESS/INJURY: NO
ADLS_ACUITY_SCORE: 18
ADLS_ACUITY_SCORE: 33
CONCENTRATING,_REMEMBERING_OR_MAKING_DECISIONS_DIFFICULTY: NO
ADLS_ACUITY_SCORE: 18
DRESSING/BATHING_DIFFICULTY: NO
FALL_HISTORY_WITHIN_LAST_SIX_MONTHS: NO
ADLS_ACUITY_SCORE: 18
TOILETING_ISSUES: NO
WALKING_OR_CLIMBING_STAIRS_DIFFICULTY: NO
ADLS_ACUITY_SCORE: 18
HEARING_DIFFICULTY_OR_DEAF: NO

## 2022-11-09 NOTE — ANESTHESIA PREPROCEDURE EVALUATION
Anesthesia Pre-Procedure Evaluation    Patient: Lizy Lovelace   MRN: [id number] : 1995        Procedure : Procedure(s):  REPEAT  SECTION AND BILATERAL SALPINGECTOMY          History reviewed. No pertinent past medical history.   Past Surgical History:   Procedure Laterality Date     C/SECTION, LOW TRANSVERSE        SECTION N/A 2020    Procedure:  SECTION;  Surgeon: Dee Britton MD;  Location: [facility];  Service: Obstetrics     NO HISTORY OF SURGERY       OPEN REDUCTION INTERNAL FIXATION RODDING INTRAMEDULLARY TIBIA  2012    Procedure: OPEN REDUCTION INTERNAL FIXATION RODDING INTRAMEDULLARY TIBIA;  Left Tibia Intramedullary Nailing;  Surgeon: Cary Vale MD;  Location:  OR     OPEN REDUCTION INTERNAL FIXATION RODDING INTRAMEDULLARY TIBIA Left       Allergies   Allergen Reactions     Cefzil Hives     Penicillin G Hives      Social History     Tobacco Use     Smoking status: Never     Smokeless tobacco: Never   Substance Use Topics     Alcohol use: Not Currently      Wt Readings from Last 1 Encounters:   22 86.2 kg (190 lb)        Anesthesia Evaluation            ROS/MED HX  ENT/Pulmonary:       Neurologic:       Cardiovascular:       METS/Exercise Tolerance:     Hematologic:       Musculoskeletal:       GI/Hepatic:       Renal/Genitourinary:       Endo:       Psychiatric/Substance Use:       Infectious Disease:       Malignancy:       Other:      (+) , previous ,         Physical Exam    Airway        Mallampati: I   TM distance: > 3 FB   Neck ROM: full   Mouth opening: > 3 cm    Respiratory Devices and Support         Dental           Cardiovascular             Pulmonary                   OUTSIDE LABS:  CBC:   Lab Results   Component Value Date    HGB 13.3 2022    HGB 11.2 (L) 2020     2020     BMP: No results found for: NA, POTASSIUM, CHLORIDE, CO2, BUN, CR, GLC  COAGS: No results found for: PTT, INR,  FIBR  POC: No results found for: BGM, HCG, HCGS  HEPATIC: No results found for: ALBUMIN, PROTTOTAL, ALT, AST, GGT, ALKPHOS, BILITOTAL, BILIDIRECT, DILMA  OTHER: No results found for: PH, LACT, A1C, BENJAMIN, PHOS, MAG, LIPASE, AMYLASE, TSH, T4, T3, CRP, SED    Anesthesia Plan    ASA Status:  2      Anesthesia Type: Spinal.              Consents    Anesthesia Plan(s) and associated risks, benefits, and realistic alternatives discussed. Questions answered and patient/representative(s) expressed understanding.    - Discussed:     - Discussed with:  Patient         Postoperative Care            Comments:    Other Comments: TAP blocks per surgeon request for post op analfesia            Leif Corbin MD

## 2022-11-09 NOTE — H&P
OB ADMISSION H&P - C SECTION     Date: 2022  NAME: Lizy Lovelace   : 1995  MRN: [id number]     CC: scheduled c section and bilateral salpingectomy    HPI: Lizy Lovelace is a 27 year old  with  single inter-uterine gestation at 39+0wks, with Estimated Date of Delivery: 22 admitted today for delivery by third repeat CS and bilateral salpingectomy.  section secondary to history of two prior CSs. Patient feels well. Has no complaints and reports good fetal movement. 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 Patient denies headache, visual changes, RUQ pain. She denies contractions, leakage of fluid, or vaginal bleeding. Please see prenatal records.     OB HISTORY   OB History    Para Term  AB Living   4 1 1 0 2 0   SAB IAB Ectopic Multiple Live Births   0 0 0 0 0      # Outcome Date GA Lbr Edwin/2nd Weight Sex Delivery Anes PTL Lv   4 AB            3 AB            2 Term            1                 PAST MEDICAL HISTORY  No past medical history on file.     PAST SURGICAL HISTORY:   Past Surgical History:   Procedure Laterality Date     C/SECTION, LOW TRANSVERSE        SECTION N/A 2020    Procedure:  SECTION;  Surgeon: Dee Britton MD;  [facility]: [facility];  Service: Obstetrics     NO HISTORY OF SURGERY       OPEN REDUCTION INTERNAL FIXATION RODDING INTRAMEDULLARY TIBIA  2012    Procedure: OPEN REDUCTION INTERNAL FIXATION RODDING INTRAMEDULLARY TIBIA;  Left Tibia Intramedullary Nailing;  Surgeon: Cary Vale MD;  Location:  OR     OPEN REDUCTION INTERNAL FIXATION RODDING INTRAMEDULLARY TIBIA Left         SOCIAL HISTORY   Reviewed, patient denies smoking and drug use  She is  . Father is  involved    MEDICATIONS  Current Facility-Administered Medications   Medication     acetaminophen (TYLENOL) tablet 975 mg     carboprost (HEMABATE) injection 250 mcg      clindamycin (CLEOCIN) infusion 900 mg     fentaNYL (PF) (SUBLIMAZE) injection 50 mcg     gentamicin (GARAMYCIN) 2 mg/kg in sodium chloride 0.9 % 50 mL intermittent infusion     lactated ringers infusion     lactated ringers infusion     lidocaine (LMX4) cream     lidocaine (LMX4) cream     lidocaine 1 % 0.1-1 mL     lidocaine 1 % 0.1-1 mL     methylergonovine (METHERGINE) injection 200 mcg     midazolam (VERSED) injection 1 mg     misoprostol (CYTOTEC) tablet 400 mcg    Or     misoprostol (CYTOTEC) tablet 800 mcg     oxytocin (PITOCIN) 30 units in 500 mL 0.9% NaCl infusion     oxytocin (PITOCIN) injection 10 Units     sodium chloride (PF) 0.9% PF flush 3 mL     sodium chloride (PF) 0.9% PF flush 3 mL     sodium chloride (PF) 0.9% PF flush 3 mL     sodium chloride (PF) 0.9% PF flush 3 mL     sodium citrate-citric acid (BICITRA) solution 30 mL     tranexamic acid (CYKLOKAPRON) bolus 1 g vial attach to NaCl 50 or 100 mL bag ADULT       ALLERGIES  Allergies   Allergen Reactions     Cefzil Hives     Penicillin G Hives       ROS: otherwise negative except what is stated in HPI.     PHYSICAL EXAM   There were no vitals taken for this visit.   Gen: no acute distress, resting comfortably   CV: acyanotic   Heart: regular rate and rhythm   Pulm: unlabored respirations, clear to ausculation bilaterally    Abd: gravid, soft, nontender   Extremities: soft, nontender   FHR: positive, category 1  [place]: no contractions      LABS  Covid negative -7-22  GBS positive  Hgb pending    IMPRESSION:   27 year old  at 39+0wks  single inter uterine pregnancy at term   Pregnancy complications include: history of two priorCSs, h/o HSV on prophylaxis since 36wks, desires sterilization   section secondary to h/o two prior CSs, and desires sterilization with bilateral salpingectomy    PLAN:   - Admit to hospital  - Type and screen/hgb  - NPO   - Proceed with  section   - anesthesia notified       RISK - C SECTION  Patient  counseled on risks, benefits, alternatives and expectations of  section.  Risks detailed to include, but not be limited to:  Pain, bleeding, infection, anesthesia complications, possible injury to bowel, bladder, baby and/or adjacent tissues, possible need for blood transfusion (with 1/50,000 risk of bloodborne pathogen [HIV and/or Hepatitis B/C] transmission) or even hysterectomy.  Patient voiced understanding of all R/B/A/E and has agreed to proceed with  section for delivery if needed or recommended.      Dee Britton MD

## 2022-11-09 NOTE — OP NOTE
Section with Postpartum Bilateral Salpingectomy Operative Note      Pre-operative Diagnosis: 1.  Intrauterine pregnancy 39 week 0 days gestation  2.  History of two prior CSs  3.  Desires permanent sterilization    Post-operative Diagnosis: same, delivered    Procedure:  Third repeat low segment transverse  section, Bilateral Salpingectomy    Surgeon:  Dee Britton M.D.    Assist:  Zara Arroyo    Anesthesia:  spinal    Estimated Blood Loss:  qbl 481cc    Complications:  None; patient tolerated the procedure well.    Specimens: right and left Fallopian tubes    Indications for surgery:  Lizy is a 27yr old  at 39+0wks admitted for delivery by scheduled third repeat CS and postpartum bilateral salpingectomy. First baby born by CS for nonreassuring FHTS in .  Second baby was failed TOLAC; got to complete/-2, then had recurrent FHR decelerations so had emergency repeat CS in .  Plan was for this pregnancy to be delivered by scheduled third repeat CS. She wished to have a tubal ligation done at the time of the CS.  This was discussed during her prenatal course; signed the sterilization papers.  Discussed again today and she wished to proceed with the CS and tubal ligation.Plan was for bilateral sal;pingectomy if tubes amenable for that.  The risks and benefits were discussed and consent obtained to proceed.    Findings:  6#11oz viable female delivered from floating LOT vertex presentation. AF clear. Bulb suctioned, loop of cord around shoulders, body and right leg.  Vigorous  apgars 8,9.  Spontaneous delivery of intact placenta was 3VC. Tubes and ovaries normal bilaterally. Entire length of both tubes removed.      [unfilled]  Procedure Details   The patient was taken to the Operating Room on [facility], identified as Lizy Lovelace and the procedure verified as  Delivery. A Time Out was held and the above information confirmed.    After administration of spinal anesthesia, the  patient was positioned in the left lateral tilt position and was prepped and draped in the usual sterile fashion. A Pfannenstiel incision was made through the previous scar line and carried down sharply through the subcutaneous tissue.  The fascia was incised horizontally, and the rectus abdominis muscles bluntly and sharply dissected away from the fascia superiorly and inferiorly to the pubic symphysis. Some adhesions of the fascia to the rectus.  The rectus muscles were  in the midline, and the peritoneum was identified.  A vertical peritoneal incision was made and extended superiorly and inferiorly to the upper edge of the bladder with stretching.  The vesicouterine peritoneal reflection was incised transversely and the bladder flap was bluntly dissected away from the lower uterine segment.     A horizontal incision was made in the lower uterine segment, and the incision was extended bilaterally in a curvilinear fashion with cephalocaudal traction.  Membranes were ruptured and the amnoitic fluid was  clear.  The infant was delivered from a floating LOT vertex presentation at 1543 hrs.  There was a loop of cord around her shoulders, body,and right leg.  Mouth and nares were bulb suctioned and cord doubly clamped and cut after 1 minute delay.  The infant was passed off to the Pediatric team in attendance with findings as noted above.      The placenta was delivered spontaneously, intact, with a 3 vessel cord.  The uterine cavity was wiped free of remaining clot and membrane.  The uterus was exteriorized and the cut edges of the uterine incision were grasped.  There were no extensions of the uterine incision.  The uterus was closed with a single layer of continuous running locked 0 vicryl. One figure of X was placed at the midline.  The uterine incision was inspected and all was hemostatic.  The Fallopian tubes and ovaries were examined and appeared normal.  The bladder flap was inspected and was  hemostatic.  The right Fallopian tube was grasped and elevated. The hand held Ligasure was used to clamp, cauterize, and cut the mesosalpinx beneath the tube from the fimbriated end to the cornua.  The tube was excised and submitted to pathology for examination.  The entire length of the left Fallopian tube was also removed with the Ligasure in the same fashion.  The tube was excised and submitted to pathology. The surgical sites were hemostatic.  The uterus was replaced into the abdominal cavity.  The pericolic gutters were swabbed clean.  The uterine incision was once again inspected once back in its normal anatomic position and was hemostatic.  The parietal peritoneum was lying together in the midline.  The subfascial space was inspected and hemostasis achieved with electrocautery.  The fascia was closed with two pieces of continuous running 0 vicryl, starting at each corner and meeting in the midline.  The subcutaneous tissue was irrigated and hemostasis achieved with electrocautery.  The subcu was reapproximated with simple interrupted 3.0 plain.  Skin edges reapproximated with subcuticular 4.0 monocryl.  The incision was dressed with Exofin and an island dressing  Anesthesia came in to place tap blocks.    Estimated blood loss was qbl 481cc.  Sponge and needle counts were correct.  There were no complications.  The patient tolerated the procedure well and was transferred to her recovery room in good condition.  The infant will be under  Nursery status.      Dee Britton M.D.

## 2022-11-09 NOTE — ANESTHESIA CARE TRANSFER NOTE
Patient: Lizy Lovelace    Procedure: Procedure(s):  REPEAT  SECTION AND BILATERAL SALPINGECTOMY       Diagnosis: Previous  section [Z98.891]  Request for sterilization [Z30.2]  Diagnosis Additional Information: No value filed.    Anesthesia Type:   Spinal     Note:    Oropharynx: oropharynx clear of all foreign objects  Level of Consciousness: awake  Oxygen Supplementation: room air    Independent Airway: airway patency satisfactory and stable  Dentition: dentition unchanged  Vital Signs Stable: post-procedure vital signs reviewed and stable  Report to RN Given: handoff report given  Patient transferred to: Labor and Delivery    Handoff Report: Identifed the Patient, Identified the Reponsible Provider, Reviewed the pertinent medical history, Discussed the surgical course, Reviewed Intra-OP anesthesia mangement and issues during anesthesia, Set expectations for post-procedure period and Allowed opportunity for questions and acknowledgement of understanding      Vitals:  Vitals Value Taken Time   /66 22 1650   Temp 37.3    Pulse 75    Resp 14    SpO2 99    Vitals shown include unvalidated device data.    Electronically Signed By: SHAI Perkins CRNA  2022  4:54 PM

## 2022-11-09 NOTE — PLAN OF CARE
Vitally stable on RA. Fundus firm with light bleeding. Foote in place with good output. Bonding well with baby. Incision with primapore dressing clean/dry/intact.

## 2022-11-09 NOTE — ANESTHESIA POSTPROCEDURE EVALUATION
Patient: Lizy Lovelace    Procedure: Procedure(s):  REPEAT  SECTION AND BILATERAL SALPINGECTOMY       Anesthesia Type:  Spinal    Note:  Disposition: Inpatient   Postop Pain Control: Uneventful            Sign Out: Well controlled pain   PONV: No   Neuro/Psych: Uneventful            Sign Out: Acceptable/Baseline neuro status   Airway/Respiratory: Uneventful            Sign Out: AIRWAY IN SITU/Resp. Support   CV/Hemodynamics: Uneventful            Sign Out: Acceptable CV status; No obvious hypovolemia; No obvious fluid overload   Other NRE: NONE   DID A NON-ROUTINE EVENT OCCUR? No           Last vitals:  Vitals:    22 1157 22 1645 22 1700   BP: 102/58 103/66 106/62   Pulse:  75    Resp:  14 16   Temp:  37.3  C (99.1  F)    SpO2:  99%        Electronically Signed By: Leif Corbin MD  2022  5:26 PM

## 2022-11-09 NOTE — PROGRESS NOTES
When pt was getting her IV placed she felt dizzy and nauseous, she was laid flat and felt better in a few minutes. Baby was heard on fetal monitor having a deceleration.  Baby heart rate returned to baseline within minutes.  IV fluids were ordered by provider.

## 2022-11-09 NOTE — ANESTHESIA PROCEDURE NOTES
"Intrathecal injection Procedure Note    Pre-Procedure   Staff -        Anesthesiologist:  Leif Corbin MD       Performed By: anesthesiologist       Location: OB       Procedure Start/Stop Times: 11/9/2022 3:18 PM and 11/9/2022 3:21 PM       Pre-Anesthestic Checklist: patient identified, IV checked, risks and benefits discussed, informed consent, monitors and equipment checked and pre-op evaluation  Timeout:       Correct Patient: Yes        Correct Procedure: Yes        Correct Site: Yes        Correct Position: Yes   Procedure Documentation  Procedure: intrathecal injection       Patient Position: sitting       Patient Prep/Sterile Barriers: sterile gloves, mask       Skin prep: Chloraprep       Insertion Site: L3-4. (midline approach).       Needle Gauge: 24.        Needle Length (Inches): 4        Spinal Needle Type: Pencan       Introducer used       # of attempts: 1 and  # of redirects:     Assessment/Narrative         Paresthesias: No.       CSF fluid: clear.    Medication(s) Administered   0.75% Hyperbaric Bupivacaine (Intrathecal) - Intrathecal   1.6 mL - 11/9/2022 3:21:00 PM  Morphine PF 1 mg/mL (Intrathecal) - Intrathecal   0.15 mg - 11/9/2022 3:21:00 PM  Medication Administration Time: 11/9/2022 3:18 PM     Comments:  Negative paresthesia or heme      FOR [facility] ONLY:   Pain Team Contact information: please page the Pain Team Via [place]. Search \"Pain\". During daytime hours, please page the attending first. At night please page the resident first.    "

## 2022-11-09 NOTE — PROGRESS NOTES
Pt updated, surgery is being delayed due to another case being in OR at scheduled time.  Surgery rescheduled to 2:00pm

## 2022-11-09 NOTE — ANESTHESIA PROCEDURE NOTES
"TAP Procedure Note    Pre-Procedure   Staff -        Anesthesiologist:  Leif Corbin MD       Performed By: anesthesiologist       Location: OB       Procedure Start/Stop Times: 11/9/2022 4:33 PM and 11/9/2022 4:38 PM       Pre-Anesthestic Checklist: patient identified, IV checked, site marked, risks and benefits discussed, informed consent, monitors and equipment checked, pre-op evaluation, at physician/surgeon's request and post-op pain management  Timeout:       Correct Patient: Yes        Correct Procedure: Yes        Correct Site: Yes        Correct Position: Yes        Correct Laterality: Yes        Site Marked: Yes  Procedure Documentation  Procedure: TAP       Laterality: bilateral       Patient Position: supine       Patient Prep/Sterile Barriers: sterile gloves, mask       Skin prep: Chloraprep       Needle Gauge: 20.        Needle Length (Inches): 4        1. Ultrasound was used to identify targeted nerve, plexus, vascular marker, or fascial plane and place a needle adjacent to it in real-time.       2. Ultrasound was used to visualize the spread of anesthetic in close proximity to the above referenced structure.       3. A permanent image is entered into the patient's record.    Assessment/Narrative         The placement was negative for: blood aspirated, painful injection and site bleeding       Paresthesias: No.       Complications: none    Medication(s) Administered   Bupivacaine 0.25% PF (Infiltration) - Infiltration   50 mL - 11/9/2022 4:33:00 PM  Medication Administration Time: 11/9/2022 4:33 PM     Comments:  25 ml each side      FOR [facility] ([facility]) ONLY:   Pain Team Contact information: please page the Pain Team Via [place]. Search \"Pain\". During daytime hours, please page the attending first. At night please page the resident first.    "

## 2022-11-10 LAB — HGB BLD-MCNC: 12.4 G/DL (ref 11.7–15.7)

## 2022-11-10 PROCEDURE — 250N000013 HC RX MED GY IP 250 OP 250 PS 637: Performed by: OBSTETRICS & GYNECOLOGY

## 2022-11-10 PROCEDURE — 36415 COLL VENOUS BLD VENIPUNCTURE: CPT | Performed by: OBSTETRICS & GYNECOLOGY

## 2022-11-10 PROCEDURE — 85018 HEMOGLOBIN: CPT | Performed by: OBSTETRICS & GYNECOLOGY

## 2022-11-10 PROCEDURE — 120N000001 HC R&B MED SURG/OB

## 2022-11-10 PROCEDURE — 250N000011 HC RX IP 250 OP 636: Performed by: OBSTETRICS & GYNECOLOGY

## 2022-11-10 RX ADMIN — SENNOSIDES AND DOCUSATE SODIUM 1 TABLET: 50; 8.6 TABLET ORAL at 09:19

## 2022-11-10 RX ADMIN — ACETAMINOPHEN 975 MG: 325 TABLET, FILM COATED ORAL at 02:32

## 2022-11-10 RX ADMIN — ACETAMINOPHEN 975 MG: 325 TABLET, FILM COATED ORAL at 20:46

## 2022-11-10 RX ADMIN — SENNOSIDES AND DOCUSATE SODIUM 1 TABLET: 50; 8.6 TABLET ORAL at 20:46

## 2022-11-10 RX ADMIN — IBUPROFEN 800 MG: 800 TABLET ORAL at 18:02

## 2022-11-10 RX ADMIN — KETOROLAC TROMETHAMINE 30 MG: 30 INJECTION, SOLUTION INTRAMUSCULAR; INTRAVENOUS at 11:26

## 2022-11-10 RX ADMIN — ACETAMINOPHEN 975 MG: 325 TABLET, FILM COATED ORAL at 09:19

## 2022-11-10 RX ADMIN — IBUPROFEN 800 MG: 800 TABLET ORAL at 23:49

## 2022-11-10 RX ADMIN — ACETAMINOPHEN 975 MG: 325 TABLET, FILM COATED ORAL at 14:33

## 2022-11-10 RX ADMIN — KETOROLAC TROMETHAMINE 30 MG: 30 INJECTION, SOLUTION INTRAMUSCULAR; INTRAVENOUS at 05:25

## 2022-11-10 ASSESSMENT — ACTIVITIES OF DAILY LIVING (ADL)
ADLS_ACUITY_SCORE: 18

## 2022-11-10 NOTE — PLAN OF CARE
Patient is POD 1 after a repeat  section.  VSS.  Voiding well after catheter removal.  Pain is well managed with tylenol and toradol.  Bleeding is light and fundus firm at U/1.  Patient will be showering this afternoon.  Will continue to monitor.

## 2022-11-10 NOTE — PLAN OF CARE
Pt. Has been vitally stable, BP's run of the softer side, not symptomatic. Pain well controlled with tylenol and toradol. Fundus remains firm with scant flow. Breast feeding infant independently. Bonding appropriately with infant. Has walked within room a couple times this shift. No other concerns noted at this time.

## 2022-11-10 NOTE — CONSULTS
Integrative Therapy Consult    Healing PresenceYes  Essential Oils: Topical (EO/Topical Oil)     Deanna -  HC, Lavender Massage Oil - HC       Healing Music:       Breathwork:       Guided Imagery:       Acupressure:       Oshibori:       Energy Therapy:       Healing Touch:       Reiki:       Qi Gong:     Massage: Foot      Targeted Massage:    Sleep Promotion:       Other Therapy:       Intervention Reason: Edema     Pre and Post Session Scores: Patient Desires Treatment: yes                             Delivery:         Referrals:      Soledad Ordonez

## 2022-11-10 NOTE — PROGRESS NOTES
" Postpartum Day 1    Patient Name:  Lizy Lovelace  :  1995  MRN:  [id number]      Assessment:  Normal postpartum course s/p CS with tubal ligation.    Plan:  Continue current care.    Subjective:  The patient feels well:  Voiding without difficulty, lochia normal, tolerating normal diet, ambulating without difficulty and passing flatus.  Voiding independently without complication. Pain is well controlled with current medications.  The patient has no emotional concerns.  The baby is well and being fed by breast.    YOB: 2024   Birth Time: 3:43 PM     Prenatal complications: history of two priorCSs, h/o HSV on prophylaxis since 36wks,    Objective:  BP 92/54   Pulse 75   Temp 98.6  F (37  C) (Oral)   Resp 14   Ht 1.6 m (5' 3\")   Wt 86.2 kg (190 lb)   SpO2 96%   Breastfeeding Yes   BMI 33.66 kg/m    Patient Vitals for the past 24 hrs:   BP Temp Temp src Pulse Resp SpO2   11/10/22 0852 92/54 98.6  F (37  C) Oral -- 14 --   11/10/22 0420 90/55 -- -- -- 16 --   22 2340 96/52 98.6  F (37  C) Oral -- 16 96 %   22 91/51 -- -- -- -- --   22 -- -- -- -- -- 96 %   22 -- -- -- -- -- 96 %   22 -- -- -- -- -- 96 %   22 -- -- -- -- -- 96 %   22 -- -- -- -- -- 95 %   22 -- -- -- -- -- 96 %   22 -- -- -- -- -- 95 %   22 -- -- -- -- -- 96 %   22 96/53 -- -- -- -- 96 %   22 96/51 -- -- -- -- --   22 -- -- -- -- -- 96 %   22 -- -- -- -- -- 94 %   22 -- -- -- -- -- 96 %   22 -- -- -- -- -- 95 %   22 -- -- -- -- -- 97 %   22 -- -- -- -- -- 95 %   22 -- -- -- -- -- 96 %   22 -- -- -- -- -- 95 %   22 -- -- -- -- -- 96 %   22 -- -- -- -- -- 96 %   22 -- -- -- -- -- 96 %   22 -- -- -- -- -- 96 %   22 100/65 99.1  F (37.3  C) Oral -- 16 -- "   11/09/22 1926 -- -- -- -- -- 96 %   11/09/22 1921 -- -- -- -- -- 96 %   11/09/22 1916 -- -- -- -- -- 97 %   11/09/22 1911 -- -- -- -- -- 96 %   11/09/22 1906 -- -- -- -- -- 96 %   11/09/22 1901 -- -- -- -- -- 96 %   11/09/22 1900 99/63 -- -- -- -- --   11/09/22 1856 -- -- -- -- -- 97 %   11/09/22 1851 -- -- -- -- -- 96 %   11/09/22 1846 -- -- -- -- -- 96 %   11/09/22 1841 -- -- -- -- -- 96 %   11/09/22 1836 -- -- -- -- -- 95 %   11/09/22 1831 -- -- -- -- -- 96 %   11/09/22 1829 115/61 -- -- -- -- --   11/09/22 1828 -- -- -- -- -- 94 %   11/09/22 1826 -- -- -- -- -- 95 %   11/09/22 1821 -- -- -- -- -- 95 %   11/09/22 1820 -- -- -- -- -- 94 %   11/09/22 1816 116/70 -- -- -- -- 96 %   11/09/22 1811 -- -- -- -- -- 95 %   11/09/22 1806 -- -- -- -- -- 95 %   11/09/22 1800 94/63 -- -- -- -- 96 %   11/09/22 1746 99/56 -- -- -- -- 96 %   11/09/22 1730 111/58 -- -- -- -- --   11/09/22 1715 109/67 -- -- -- -- --   11/09/22 1700 106/62 -- -- -- 16 --   11/09/22 1645 103/66 99.1  F (37.3  C) Axillary 75 14 99 %   11/09/22 1157 102/58 -- -- -- -- --   11/09/22 1141 90/57 -- -- -- -- --   11/09/22 1140 (!) 89/57 -- -- -- -- --       Exam: Patient A&O x 3. No acute distress, breathing unlabored. The amount and color of the lochia is appropriate for the duration of recovery. Uterine fundus is firm at U-1. Urinary output is adequate. The low transverse surgical dressing is clean, dry and intact.    Lab Results   Component Value Date    AS Negative 11/09/2022    HGB 12.4 11/10/2022       Immunization History   Administered Date(s) Administered     COVID-19,PF,Moderna 03/26/2021, 01/17/2022       Provider: Miguelito Monae CNM    Date:  11/10/2022  Time:  11:32 AM

## 2022-11-10 NOTE — PLAN OF CARE
Vital signs are stable. Patient is just starting to regain lower body sensation and mobility. Per report/order- zepeda is ok to stay in until patient is ready for it to be removed. Patient requesting to leave it in overnight but states she will let this nurse know if she wants it out before then. Patient denies pain and is putting out large amounts of urine via indwelling zepeda catheter. She is independent with breastfeeding and verbalizes understanding to call for assistance if needed.   Akilah Franco RN on 11/9/2022 at 8:33 PM

## 2022-11-11 VITALS
WEIGHT: 190 LBS | OXYGEN SATURATION: 96 % | HEIGHT: 63 IN | DIASTOLIC BLOOD PRESSURE: 67 MMHG | TEMPERATURE: 98.2 F | BODY MASS INDEX: 33.66 KG/M2 | SYSTOLIC BLOOD PRESSURE: 106 MMHG | HEART RATE: 71 BPM | RESPIRATION RATE: 18 BRPM

## 2022-11-11 LAB
PATH REPORT.COMMENTS IMP SPEC: NORMAL
PATH REPORT.COMMENTS IMP SPEC: NORMAL
PATH REPORT.FINAL DX SPEC: NORMAL
PATH REPORT.GROSS SPEC: NORMAL
PATH REPORT.MICROSCOPIC SPEC OTHER STN: NORMAL
PATH REPORT.RELEVANT HX SPEC: NORMAL
PHOTO IMAGE: NORMAL

## 2022-11-11 PROCEDURE — 250N000013 HC RX MED GY IP 250 OP 250 PS 637: Performed by: OBSTETRICS & GYNECOLOGY

## 2022-11-11 RX ORDER — AMOXICILLIN 250 MG
1 CAPSULE ORAL DAILY PRN
Qty: 30 TABLET | Refills: 0 | Status: SHIPPED | OUTPATIENT
Start: 2022-11-11

## 2022-11-11 RX ORDER — OXYCODONE HYDROCHLORIDE 5 MG/1
5 TABLET ORAL EVERY 6 HOURS PRN
Qty: 13 TABLET | Refills: 0 | Status: SHIPPED | OUTPATIENT
Start: 2022-11-11

## 2022-11-11 RX ORDER — IBUPROFEN 800 MG/1
800 TABLET, FILM COATED ORAL EVERY 8 HOURS PRN
Qty: 20 TABLET | Refills: 0 | Status: SHIPPED | OUTPATIENT
Start: 2022-11-11

## 2022-11-11 RX ORDER — ACETAMINOPHEN 325 MG/1
975 TABLET ORAL EVERY 6 HOURS
COMMUNITY
Start: 2022-11-11

## 2022-11-11 RX ADMIN — IBUPROFEN 800 MG: 800 TABLET ORAL at 06:03

## 2022-11-11 RX ADMIN — ACETAMINOPHEN 975 MG: 325 TABLET, FILM COATED ORAL at 02:20

## 2022-11-11 RX ADMIN — SENNOSIDES AND DOCUSATE SODIUM 1 TABLET: 50; 8.6 TABLET ORAL at 09:19

## 2022-11-11 RX ADMIN — IBUPROFEN 800 MG: 800 TABLET ORAL at 12:31

## 2022-11-11 RX ADMIN — ACETAMINOPHEN 975 MG: 325 TABLET, FILM COATED ORAL at 09:18

## 2022-11-11 ASSESSMENT — ACTIVITIES OF DAILY LIVING (ADL)
ADLS_ACUITY_SCORE: 18

## 2022-11-11 NOTE — DISCHARGE SUMMARY
OB Discharge Summary      Date:  2022    Name:  Lizy Lovelace  :  1995  MRN:  [id number]      Admission Date:  2022  Delivery Date: 2022   Gestational Age at Delivery:  39w0d  Discharge Date:  2022    Principal Diagnosis:    Patient Active Problem List   Diagnosis     Pregnant      delivery affecting       delivery delivered         Conditions complicating Pregnancy: history of two prior CSs, h/o HSV on prophylaxis since 36wks    Procedure(s) Performed:  Spinal, third repeat low segment transverse  section, Bilateral salpingectomy    Indication for : History of two prior CSs  Indication for Induction:  None     Condition at Discharge:  Good    Discharge Medications:      Review of your medicines      START taking      Dose / Directions   acetaminophen 325 MG tablet  Commonly known as: TYLENOL      Dose: 975 mg  Take 3 tablets (975 mg) by mouth every 6 hours  Refills: 0     ibuprofen 800 MG tablet  Commonly known as: ADVIL/MOTRIN      Dose: 800 mg  Take 1 tablet (800 mg) by mouth every 8 hours as needed for moderate pain (4-6)  Quantity: 20 tablet  Refills: 0     oxyCODONE 5 MG tablet  Commonly known as: ROXICODONE      Dose: 5 mg  Take 1 tablet (5 mg) by mouth every 6 hours as needed for moderate to severe pain  Quantity: 13 tablet  Refills: 0     senna-docusate 8.6-50 MG tablet  Commonly known as: SENOKOT-S/PERICOLACE      Dose: 1 tablet  Take 1 tablet by mouth daily as needed for constipation  Quantity: 30 tablet  Refills: 0        CONTINUE these medicines which have NOT CHANGED      Dose / Directions   prenatal multivitamin w/iron 27-0.8 MG tablet      Dose: 1 tablet  Take 1 tablet by mouth daily  Refills: 0        STOP taking    hydrOXYzine 25 MG capsule  Commonly known as: VISTARIL        valACYclovir 500 MG tablet  Commonly known as: VALTREX              Where to get your medicines      Some of these will need a paper prescription and others  can be bought over the counter. Ask your nurse if you have questions.    Bring a paper prescription for each of these medications    ibuprofen 800 MG tablet    oxyCODONE 5 MG tablet    senna-docusate 8.6-50 MG tablet  You don't need a prescription for these medications    acetaminophen 325 MG tablet          Discharge Plan:    Follow up with /DEIRDRE:  [facility] in 2 weeks for an incision check and in 6 weeks for a postpartum visit   Patient Instructions:      Physical activity: No driving while on narctoics. No lifting anything greater than 15lbs for 6 weeks. Nothing in the vagina for 6 weeks.     Diet:  Regular. Drink 100 oz (3 liters) daily.     Medication: As listed above. May alternate ibuprofen and acetaminophen for pain management.       Physician/CNM: SHAI Linton CNM    Name:  Lizy Lovelace  :  1995  MRN:  [id number]

## 2022-11-11 NOTE — PROGRESS NOTES
Outreach Note for TASNEEM Lovelace  [id number]  1995    Discharge follow-up plan discussed with patient, needs assessed. Pt requests all follow-up through clinic/physician, declines home care visit, unless medically indicated and ordered by physician, and declines follow-up phone call.   No further needs identified at this time.

## 2022-11-11 NOTE — PLAN OF CARE
Pt status post  & doing well. Stable vitals & pain controlled with PO pain meds.  Pt up with good strength & independent with cares.  Will continue to monitor & assess.

## 2022-11-11 NOTE — PLAN OF CARE
Vitals stable & flow WNL.  Pain controlled well. Bonding noted with infant & breastfeeding throughout the night.  Will continue to monitor & assist.

## 2022-11-11 NOTE — PLAN OF CARE
Patient is a POD 2 patient who is stable.  Pain is controlled with tylenol and ibuprofen.  Bleeding is scant.  VSS.  Discharge instructions reviewed.

## 2022-11-11 NOTE — PROGRESS NOTES
" Postpartum Day 2    Patient Name:  Lizy Lovelace  :  1995  MRN:  [id number]      Assessment:  Normal postpartum course.    Plan:  Continue current care.  Discharge home today per patient preference.      Subjective:  The patient feels well:  Voiding without difficulty, lochia normal, tolerating normal diet, and passing flatus.  She denies headache, vision changes, URQ/epigastric pain, dizziness, lightheadedness, shortness of breath, and tachycardia. Pain is well controlled with current medications.  The patient has no emotional concerns.  The baby is well. She is ready to go home today.    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 No acute distress, breathing unlabored. The amount and color of the lochia is appropriate for the duration of recovery. Uterine fundus is firm at U-2. Urinary output is adequate. The uncovered low transverse incision is healing well.  The patient is ambulating well without assistance.  The patient is tolerating a regular diet.    Lab Results   Component Value Date    AS Negative 2022    HGB 12.4 11/10/2022       Immunization History   Administered Date(s) Administered     COVID-19,PF,Moderna 2021, 2022         Provider:  SHAI Linton CNM    Date:  2022  Time:  10:54 AM  "

## (undated) DEVICE — PAD INSERT MED PEACH 14X6.5 62550

## (undated) DEVICE — SUCTION MANIFOLD NEPTUNE 2 SYS 1 PORT 702-025-000

## (undated) DEVICE — SUTURE VICRYL+ 0 36IN CT-1 UND VCP946H

## (undated) DEVICE — GLOVE SURG PI ULTRA TOUCH M SZ 6-1/2 LF

## (undated) DEVICE — SUTURE MONOCRYL+ 4-0 PS-2 27IN MCP426H

## (undated) DEVICE — DRAPE STERI CESAREAN W/POUCH 7966

## (undated) DEVICE — SUTURE PLAIN 2-0 CTX 872H

## (undated) DEVICE — LINER PRINTED RED HAZARD 24X24 A4824PRRO

## (undated) DEVICE — SUCTION MITY VAC MUSHROOM CUP 10007LP

## (undated) DEVICE — ESU LIGASURE DISSECTOR EXACT LF2019

## (undated) DEVICE — PACK MAJOR BASIN 673

## (undated) DEVICE — DRSG PRIMAPORE 02X3"

## (undated) DEVICE — SOL NACL 0.9% IRRIG 1000ML BOTTLE 2F7124

## (undated) DEVICE — ADH SKIN CLOSURE PREMIERPRO EXOFIN 1.0ML 3470

## (undated) DEVICE — SU PLAIN 0 TIE 54" S104H

## (undated) DEVICE — SOL WATER IRRIG 1000ML BOTTLE 2F7114

## (undated) DEVICE — PREP CHLORAPREP 26ML TINTED HI-LITE ORANGE 930815

## (undated) DEVICE — UNDERPAD 30X30 BULK 949B10

## (undated) DEVICE — DRSG PRIMAPORE 04X11 3/4"

## (undated) DEVICE — GOWN IMPERVIOUS BREATHABLE SMART LG 89015

## (undated) DEVICE — PLATE GROUNDING ADULT W/CORD 9165L

## (undated) DEVICE — CUSTOM PACK C-SECTION LHE

## (undated) DEVICE — PACK MINOR SINGLE BASIN SSK3001

## (undated) RX ORDER — GLYCOPYRROLATE 0.2 MG/ML
INJECTION INTRAMUSCULAR; INTRAVENOUS
Status: DISPENSED
Start: 2022-11-09

## (undated) RX ORDER — ONDANSETRON 2 MG/ML
INJECTION INTRAMUSCULAR; INTRAVENOUS
Status: DISPENSED
Start: 2022-11-09

## (undated) RX ORDER — FENTANYL CITRATE-0.9 % NACL/PF 10 MCG/ML
PLASTIC BAG, INJECTION (ML) INTRAVENOUS
Status: DISPENSED
Start: 2022-11-09

## (undated) RX ORDER — KETOROLAC TROMETHAMINE 30 MG/ML
INJECTION, SOLUTION INTRAMUSCULAR; INTRAVENOUS
Status: DISPENSED
Start: 2022-11-09

## (undated) RX ORDER — OXYTOCIN/0.9 % SODIUM CHLORIDE 30/500 ML
PLASTIC BAG, INJECTION (ML) INTRAVENOUS
Status: DISPENSED
Start: 2022-11-09

## (undated) RX ORDER — MORPHINE SULFATE 1 MG/ML
INJECTION, SOLUTION EPIDURAL; INTRATHECAL; INTRAVENOUS
Status: DISPENSED
Start: 2022-11-09

## (undated) RX ORDER — DEXAMETHASONE SODIUM PHOSPHATE 4 MG/ML
INJECTION, SOLUTION INTRA-ARTICULAR; INTRALESIONAL; INTRAMUSCULAR; INTRAVENOUS; SOFT TISSUE
Status: DISPENSED
Start: 2022-11-09